# Patient Record
Sex: MALE | Race: WHITE | NOT HISPANIC OR LATINO | URBAN - NONMETROPOLITAN AREA
[De-identification: names, ages, dates, MRNs, and addresses within clinical notes are randomized per-mention and may not be internally consistent; named-entity substitution may affect disease eponyms.]

---

## 2021-12-20 ENCOUNTER — IMPORTED ENCOUNTER (OUTPATIENT)
Dept: URBAN - NONMETROPOLITAN AREA CLINIC 1 | Facility: CLINIC | Age: 71
End: 2021-12-20

## 2021-12-20 PROBLEM — H52.4: Noted: 2021-12-20

## 2021-12-20 PROBLEM — Z96.1: Noted: 2021-12-20

## 2021-12-20 PROBLEM — H25.12: Noted: 2021-12-20

## 2021-12-20 PROBLEM — H52.03: Noted: 2021-12-20

## 2021-12-20 PROBLEM — H52.223: Noted: 2021-12-20

## 2021-12-20 PROCEDURE — 92004 COMPRE OPH EXAM NEW PT 1/>: CPT

## 2021-12-20 PROCEDURE — 92015 DETERMINE REFRACTIVE STATE: CPT

## 2021-12-20 NOTE — PATIENT DISCUSSION
Mixed Astigmatism OU w/Presbyopia-  discussed findings w/patient-  no spectacle Rx issued-  refer to [facility] for cat eval OS-  continue to monitor as per JSCataract OS-  discussed findings w/patient in detail-  discussed signs and symptoms associated-  worsening noted at this time-  refer to [facility] for cat evalPseudophakia OD-  discussed findings w/patient-  stable findings at this time-  continue to monitor yearly or prn; 's Notes: MR 12/20/2021DFE 12/20/2021

## 2022-03-11 ENCOUNTER — CONSULTATION/EVALUATION (OUTPATIENT)
Dept: URBAN - NONMETROPOLITAN AREA CLINIC 4 | Facility: CLINIC | Age: 72
End: 2022-03-11

## 2022-03-11 DIAGNOSIS — H25.12: ICD-10-CM

## 2022-03-11 PROCEDURE — 99214 OFFICE O/P EST MOD 30 MIN: CPT

## 2022-03-11 ASSESSMENT — VISUAL ACUITY
OS_CC: 20/40+1
OD_SC: 20/25
OS_PH: 20/50
OS_AM: 20/20
OS_SC: 20/80

## 2022-03-11 ASSESSMENT — TONOMETRY
OD_IOP_MMHG: 13
OS_IOP_MMHG: 13

## 2022-03-11 NOTE — PATIENT DISCUSSION
(Surgical) Visually Significant (secondary to glare), discussed the risks, benefits, alternatives, and limitations of cataract surgery. The patient stated a full understanding and a desire to proceed with the procedure. The patient will need to return for pre-op appointment with cataract measurements and to have any additional questions answered, and start pre-operative eye drops as directed. Pt elects to proceed with Stand/Trad OS and discussed need for bifocals. *[id number]*  Dextenza +.

## 2022-03-11 NOTE — PATIENT DISCUSSION
Mixed Astigmatism OU w/Presbyopia-  discussed findings w/patient-  no spectacle Rx issued-  refer to [address] [place] [address] for cat eval OS-  continue to monitor as per JSCataract OS-  discussed findings w/patient in detail-  discussed signs and symptoms associated-  worsening noted at this time-  refer to [address] [place] [address] for cat evalPseudophakia OD-  discussed findings w/patient-  stable findings at this time-  continue to monitor yearly or prn; 's Notes: MR 12/20/2021D 12/20/2021.

## 2022-03-29 ENCOUNTER — POST-OP (OUTPATIENT)
Dept: URBAN - NONMETROPOLITAN AREA CLINIC 4 | Facility: CLINIC | Age: 72
End: 2022-03-29

## 2022-03-29 DIAGNOSIS — Z96.1: ICD-10-CM

## 2022-03-29 PROCEDURE — 99024 POSTOP FOLLOW-UP VISIT: CPT

## 2022-03-29 ASSESSMENT — TONOMETRY
OD_IOP_MMHG: 12
OS_IOP_MMHG: 13

## 2022-03-29 ASSESSMENT — VISUAL ACUITY
OS_PH: 20/40
OS_SC: 20/60
OD_SC: 20/25
OU_SC: 20/20-2

## 2022-04-04 ENCOUNTER — POST-OP (OUTPATIENT)
Dept: URBAN - NONMETROPOLITAN AREA CLINIC 4 | Facility: CLINIC | Age: 72
End: 2022-04-04

## 2022-04-04 DIAGNOSIS — Z96.1: ICD-10-CM

## 2022-04-04 PROCEDURE — 99024 POSTOP FOLLOW-UP VISIT: CPT

## 2022-04-04 ASSESSMENT — TONOMETRY
OD_IOP_MMHG: 16
OS_IOP_MMHG: 16

## 2022-04-04 ASSESSMENT — VISUAL ACUITY
OD_SC: 20/30
OS_PH: 20/25-2
OS_SC: 20/50+1

## 2022-04-10 ASSESSMENT — VISUAL ACUITY
OD_PH: 20/25
OU_CC: 20/30
OD_CC: 20/40+2
OS_PH: 20/60
OS_CC: 20/70

## 2022-04-10 ASSESSMENT — TONOMETRY
OD_IOP_MMHG: 16
OS_IOP_MMHG: 16

## 2022-04-26 ENCOUNTER — CONSULTATION/EVALUATION (OUTPATIENT)
Dept: URBAN - NONMETROPOLITAN AREA CLINIC 4 | Facility: CLINIC | Age: 72
End: 2022-04-26

## 2022-04-26 DIAGNOSIS — H02.115: ICD-10-CM

## 2022-04-26 PROCEDURE — 99214 OFFICE O/P EST MOD 30 MIN: CPT

## 2022-04-26 ASSESSMENT — VISUAL ACUITY
OS_SC: 20/40
OU_SC: 20/25
OD_SC: 20/200
OS_SC: 20/200
OD_SC: 20/30-1

## 2022-04-26 ASSESSMENT — TONOMETRY
OS_IOP_MMHG: 16
OD_IOP_MMHG: 16

## 2022-04-26 NOTE — PATIENT DISCUSSION
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 hx of lid repair by other surgeon. *Ptosis OD will do Ptosis repair & graft skin form RUL to LLL *.

## 2022-04-26 NOTE — PATIENT DISCUSSION
The risks, benefits, and alternatives to surgery were discussed. The patient elects to proceed with surgery. Will have patient scheduled.

## 2022-06-29 ENCOUNTER — DIAGNOSTICS ONLY (OUTPATIENT)
Dept: URBAN - NONMETROPOLITAN AREA CLINIC 4 | Facility: CLINIC | Age: 72
End: 2022-06-29

## 2022-06-29 DIAGNOSIS — H02.115: ICD-10-CM

## 2022-06-29 PROCEDURE — 92285 EXTERNAL OCULAR PHOTOGRAPHY: CPT
